# Patient Record
Sex: FEMALE | Race: WHITE | NOT HISPANIC OR LATINO | URBAN - METROPOLITAN AREA
[De-identification: names, ages, dates, MRNs, and addresses within clinical notes are randomized per-mention and may not be internally consistent; named-entity substitution may affect disease eponyms.]

---

## 2024-06-16 ENCOUNTER — EMERGENCY (EMERGENCY)
Facility: HOSPITAL | Age: 69
LOS: 1 days | Discharge: HOME | End: 2024-06-16
Attending: EMERGENCY MEDICINE
Payer: COMMERCIAL

## 2024-06-16 VITALS
RESPIRATION RATE: 16 BRPM | DIASTOLIC BLOOD PRESSURE: 84 MMHG | SYSTOLIC BLOOD PRESSURE: 125 MMHG | TEMPERATURE: 98 F | WEIGHT: 133.82 LBS | OXYGEN SATURATION: 99 % | HEIGHT: 61 IN | HEART RATE: 76 BPM

## 2024-06-16 PROCEDURE — 72125 CT NECK SPINE W/O DYE: CPT | Mod: MC

## 2024-06-16 PROCEDURE — 70450 CT HEAD/BRAIN W/O DYE: CPT | Mod: MC

## 2024-06-16 PROCEDURE — 70450 CT HEAD/BRAIN W/O DYE: CPT | Mod: 26,MC

## 2024-06-16 PROCEDURE — 99284 EMERGENCY DEPT VISIT MOD MDM: CPT | Mod: 25

## 2024-06-16 PROCEDURE — 72125 CT NECK SPINE W/O DYE: CPT | Mod: 26,MC

## 2024-06-16 PROCEDURE — 99284 EMERGENCY DEPT VISIT MOD MDM: CPT

## 2024-06-16 RX ORDER — ACETAMINOPHEN 500 MG
2 TABLET ORAL
Qty: 40 | Refills: 0
Start: 2024-06-16 | End: 2024-06-20

## 2024-06-16 RX ORDER — OXYCODONE HYDROCHLORIDE 5 MG/1
1 TABLET ORAL
Qty: 4 | Refills: 0
Start: 2024-06-16 | End: 2024-06-16

## 2024-06-16 NOTE — ED PROVIDER NOTE - CLINICAL SUMMARY MEDICAL DECISION MAKING FREE TEXT BOX
, [age]-year-old female presents with  hematoma to posterior head status post mechanical fall when she was walking backwards.  No loss of consciousness.  No anticoagulation use.  No nausea or vomiting.  CT head and cervical spine showed no intracranial hemorrhage or fracture.  Ambulatory in the ED.  Outpatient follow-up.

## 2024-06-16 NOTE — ED PROVIDER NOTE - NSFOLLOWUPINSTRUCTIONS_ED_ALL_ED_FT
Headache    A headache is pain or discomfort felt around the head or neck area. The specific cause of a headache may not be found as there are many types including tension headaches, migraine headaches, and cluster headaches. Watch your condition for any changes. Things you can do to manage your pain include taking over the counter and prescription medications as instructed by your health care provider, lying down in a dark quiet room, limiting stress, getting regular sleep, and refraining from alcohol and tobacco products.    SEEK IMMEDIATE MEDICAL CARE IF YOU HAVE ANY OF THE FOLLOWING SYMPTOMS: fever, vomiting, stiff neck, loss of vision, problems with speech, muscle weakness, loss of balance, trouble walking, passing out, or confusion.    Please take medication as directed   Please follow up with Primary care doctor as needed

## 2024-06-16 NOTE — ED ADULT TRIAGE NOTE - CHIEF COMPLAINT QUOTE
Pt s/p fall while outside walking backwards. Pt c/o back pain and head pain. No LOC, not on thinners. Denies nausea, dizziness

## 2024-06-16 NOTE — ED PROVIDER NOTE - PATIENT PORTAL LINK FT
You can access the FollowMyHealth Patient Portal offered by [facility] by registering at the following website: [email or web address]. By joining [email or web address]’s FollowMyHealth portal, you will also be able to view your health information using other applications (apps) compatible with our system.

## 2024-06-16 NOTE — ED PROVIDER NOTE - NS ED ROS FT
Gen: denies fever, chills  Skin: (+) Hematoma   HEENT: denies visual changes, ear pain, nasal congestion  Respiratory: denies GIL, SOB, cough  Cardiovascular: denies chest pain, palpitations, diaphoresis  GI: denies abdominal pain, no n/v  : denies dysuria, frequency, urgency  MSK: denies joint swelling/pain, no back pain, no neck pain  Neuro: (+) headache, no dizziness, weakness, numbness

## 2024-06-16 NOTE — ED ADULT NURSE NOTE - OBJECTIVE STATEMENT
Pt A&OX4. Came in s/p trip and fall while walking backwards, patients shoe got stuck, fell backwards hit her back and head on cement, c/o mild pain to posterior head. Pt -LOC, -blood thinners, +head strike. Denies N/V, weakness, numbness, tingling, headache, chest pain, SOB, visual disturbances. VS stable, RR even and unlabored, safety maintained.

## 2024-06-16 NOTE — ED PROVIDER NOTE - PHYSICAL EXAMINATION
Gen: No acute distress, non toxic female, speaking in full sentences   HEENT: NC, Mucous membranes moist, Oropharynx without exudates, uvula midline  Eyes: pink conjunctivae, EOMI, PERRL  CV: RRR, nl s1/s2 noted  Resp: CTAB, normal rate and effort  GI: Abdomen soft, NT, ND. No rebound, no guarding  Neuro: A&O x 3, sensorimotor intact without deficits   MSK: No midline or paraspinal spine or joint tenderness to palpation, Full ROM ext x 4  Skin: (+) Hematoma to posterior scalp, skin intact   Ambulatory in the ED without gait disturbance

## 2024-06-16 NOTE — ED PROVIDER NOTE - OBJECTIVE STATEMENT
67 y/o female with no pmhx presents to the ED for fall today on pavement. She states she was walking backwards for exercise. She states she misstep and fell backwards, hitting her back and then neck and head, hematoma to posterior head. She was able to get up on her own and ambulate away. No dizziness, no vision changes, no abd pain, no numbness/tingling. No daily meds.